# Patient Record
Sex: MALE | Race: WHITE | NOT HISPANIC OR LATINO
[De-identification: names, ages, dates, MRNs, and addresses within clinical notes are randomized per-mention and may not be internally consistent; named-entity substitution may affect disease eponyms.]

---

## 2022-03-02 PROBLEM — Z00.00 ENCOUNTER FOR PREVENTIVE HEALTH EXAMINATION: Status: ACTIVE | Noted: 2022-03-02

## 2023-04-20 ENCOUNTER — APPOINTMENT (OUTPATIENT)
Dept: ORTHOPEDIC SURGERY | Facility: CLINIC | Age: 68
End: 2023-04-20
Payer: MEDICARE

## 2023-04-20 VITALS — BODY MASS INDEX: 25.01 KG/M2 | WEIGHT: 165 LBS | HEIGHT: 68 IN

## 2023-04-20 PROCEDURE — 73030 X-RAY EXAM OF SHOULDER: CPT | Mod: 50

## 2023-04-20 PROCEDURE — J3490M: CUSTOM | Mod: NC

## 2023-04-20 PROCEDURE — 20611 DRAIN/INJ JOINT/BURSA W/US: CPT | Mod: 50

## 2023-04-20 PROCEDURE — 99213 OFFICE O/P EST LOW 20 MIN: CPT | Mod: 25

## 2023-04-20 NOTE — PROCEDURE
[FreeTextEntry3] : Large Joint Injection / Aspiration: Celestone, Lidocaine, Marcaine and Guidance Ultrasound\par Large Joint Injection was performed because of pain and inflammation. Anesthesia: ethyl chloride sprayed topically.. \par Celestone: An injection of Celestone 12 mg , 2 cc. Needle size: 22 gauge , 1.5 inch. \par Lidocaine: 3 cc. \par Marcaine: 3 cc. \par \par Medication was injected in the bilateral shoulders. Patient has tried OTC's including aspirin, Ibuprofen, Aleve etc or prescription NSAIDS, anr exercises at home and/ or physical therapy without satisfactory response. After verbal consent using sterile preparation and technique. The risks, benefits, and alternatives to cortisone injection were explained in full to the patient. Risks outlined include but are not limited to infection, sepsis, bleeding, scarring, skin discoloration, temporary increase in pain, syncopal episode, failure to resolve symptoms, allergic reaction, symptom recurrence, and elevation of blood sugar in diabetics. Patient understood the risks. All questions were answered. After discussion of options, patient requested an injection. Oral informed consent was obtained and sterile prep was done of the injection site. Sterile technique was utilized for the procedure including the preparation of the solutions used for the injection. Patient tolerated the procedure well. Advised to ice the injection site this evening. Prep with betadine locally to site. Sterile technique used. Patient tolerated procedure well. Post Procedure Instructions: Patient was advised to call if redness, pain, or fever occur and apply ice for 15 min. out of every hour for the next 12-24 hours as tolerated. patient was advised to rest the joint(s) for 1 days. \par \par Ultrasound Guidance was used for the following reasons: for Glenohumeral injection. \par \par Ultrasound guided injection was performed of the shoulders, visualization of the needle and placement of injection was performed without complication.\par

## 2023-04-20 NOTE — PHYSICAL EXAM
[Sitting] : sitting [Moderate] : moderate [5___] : internal rotation 5[unfilled]/5 [Bilateral] : shoulder bilaterally [Degenerative change] : Degenerative change [Glenohumeral arthritis] : Glenohumeral arthritis [] : no scapular winging [FreeTextEntry1] : adv GH [TWNoteComboBox7] : active forward flexion 140 degrees [TWNoteComboBox6] : internal rotation 0 degrees [de-identified] : external rotation 20 degrees

## 2023-04-20 NOTE — HISTORY OF PRESENT ILLNESS
[Sudden] : sudden [7] : 7 [0] : 0 [Dull/Aching] : dull/aching [Localized] : localized [Intermittent] : intermittent [Leisure] : leisure [Sleep] : sleep [Rest] : rest [de-identified] : 68 year old male with increasing symptoms in both shoulders, right greater than left, history of OA in both shoulders, and pain has been worsening recently. He has pain with reaching over head, behind back and sleeping. No radicular complaints. ROM has been more limited. He has had CSI in the past with some help.  [] : Post Surgical Visit: no [FreeTextEntry1] : bilateral shoulders [FreeTextEntry5] : Wear and tear over time [de-identified] : activity/sleep [de-identified] :

## 2023-04-20 NOTE — DISCUSSION/SUMMARY
[de-identified] : Patient allowed to gently start resuming activities. \par Discussed change to medication prescription and usage. \par Bracing options discussed with patient. \par Activity modification as needed\par Discussed poss future surgery, pt deciding\par needs TSAs\par will try csi

## 2023-04-27 ENCOUNTER — APPOINTMENT (OUTPATIENT)
Dept: ORTHOPEDIC SURGERY | Facility: CLINIC | Age: 68
End: 2023-04-27
Payer: MEDICARE

## 2023-04-27 VITALS — BODY MASS INDEX: 25.01 KG/M2 | HEIGHT: 68 IN | WEIGHT: 165 LBS

## 2023-04-27 PROCEDURE — 20611 DRAIN/INJ JOINT/BURSA W/US: CPT | Mod: RT

## 2023-04-27 PROCEDURE — 99213 OFFICE O/P EST LOW 20 MIN: CPT | Mod: 25

## 2023-04-27 NOTE — PHYSICAL EXAM
[Right] : right knee [5___] : quadriceps 5[unfilled]/5 [] : no ecchymosis [TWNoteComboBox7] : flexion 125 degrees

## 2023-04-27 NOTE — ASSESSMENT
[FreeTextEntry1] : Patient allowed to gently start resuming activities.\par Discussed change to medication prescription and usage. \par Offered cortisone steroid injection. \par Bracing options discussed with patient. \par Hyaluronic Acid inj pamphlet given to pt.\par 04/27/2023 \par RE:  ARIEL HU \par \par Acct #- [id number] \par Attention:  Nurse Reviewer /Medical Director\par \par I am writing this letter as a medical necessity for HA orthovisc R knee\par Patient has tried analgesics, non-steroid anti-inflammatory agents, \par physical therapy, hot or cold compresses,injections of corticosteroids, etc)  which in combination or by themselves has not worked.\par Based on my patient's condition, I strongly believe that the Hyaluronic aid injections is medically needed.\par  \par Thank you for your time and consideration.   \par \par

## 2023-04-28 ENCOUNTER — APPOINTMENT (OUTPATIENT)
Dept: ORTHOPEDIC SURGERY | Facility: CLINIC | Age: 68
End: 2023-04-28
Payer: MEDICARE

## 2023-04-28 VITALS — HEIGHT: 68 IN | WEIGHT: 165 LBS | BODY MASS INDEX: 25.01 KG/M2

## 2023-04-28 DIAGNOSIS — Z78.9 OTHER SPECIFIED HEALTH STATUS: ICD-10-CM

## 2023-04-28 DIAGNOSIS — E78.00 PURE HYPERCHOLESTEROLEMIA, UNSPECIFIED: ICD-10-CM

## 2023-04-28 DIAGNOSIS — I10 ESSENTIAL (PRIMARY) HYPERTENSION: ICD-10-CM

## 2023-04-28 PROCEDURE — 99214 OFFICE O/P EST MOD 30 MIN: CPT

## 2023-04-28 NOTE — ASSESSMENT
[FreeTextEntry1] : Bilateral GH DJD.\par Xrays and advanced imaging reviewed.\par Discussed op versus non op tx, including the r/b/a/c of both.\par Discussed rehab and recovery after TSA/RSA.\par Discussed timing, frequency and efficacy of cortisone injections.\par Discussed risks of repeated cortisone injections. \par B/L GH injections by Dr. Chase 4/20/23.\par Discussed trial of visco supplementation.\par Aleve prn.\par RTO prn.\par \par

## 2023-04-28 NOTE — PHYSICAL EXAM
[Bilateral] : shoulder bilaterally [5 ___] : forward flexion 5[unfilled]/5 [5___] : external rotation 5[unfilled]/5 [] : discomfort with strength testing [FreeTextEntry9] : FE R 130  L 140\par ER 40 b/l

## 2023-04-28 NOTE — HISTORY OF PRESENT ILLNESS
[de-identified] : 4/28/23: 67 yo RHD male with bilateral shoulder pain since about 2021. Right is worse than left. There is pain with reaching. He has pain at night and is unable to sleep. He saw Dr. Chase and had CSI 4/20/23 with some relief. He sees pain mgt and used to take Naproxen and now Aleve.

## 2023-05-04 ENCOUNTER — APPOINTMENT (OUTPATIENT)
Dept: ORTHOPEDIC SURGERY | Facility: CLINIC | Age: 68
End: 2023-05-04
Payer: MEDICARE

## 2023-05-04 PROCEDURE — 20611 DRAIN/INJ JOINT/BURSA W/US: CPT | Mod: RT

## 2023-05-04 NOTE — HISTORY OF PRESENT ILLNESS
[2] : 2 [Orthovisc] : Orthovisc [de-identified] : PAtient returns for Orthovisc #2 right knee.  [] : no [de-identified] : 04/27/2023 [de-identified] : right knee

## 2023-05-11 ENCOUNTER — APPOINTMENT (OUTPATIENT)
Dept: ORTHOPEDIC SURGERY | Facility: CLINIC | Age: 68
End: 2023-05-11
Payer: MEDICARE

## 2023-05-11 VITALS — BODY MASS INDEX: 25.01 KG/M2 | HEIGHT: 68 IN | WEIGHT: 165 LBS

## 2023-05-11 DIAGNOSIS — M17.11 UNILATERAL PRIMARY OSTEOARTHRITIS, RIGHT KNEE: ICD-10-CM

## 2023-05-11 PROCEDURE — 20611 DRAIN/INJ JOINT/BURSA W/US: CPT | Mod: RT

## 2023-05-11 RX ORDER — MELOXICAM 15 MG/1
15 TABLET ORAL
Qty: 30 | Refills: 1 | Status: ACTIVE | COMMUNITY
Start: 2023-05-11 | End: 1900-01-01

## 2023-05-18 ENCOUNTER — APPOINTMENT (OUTPATIENT)
Dept: ORTHOPEDIC SURGERY | Facility: CLINIC | Age: 68
End: 2023-05-18
Payer: MEDICARE

## 2023-05-18 PROCEDURE — 20611 DRAIN/INJ JOINT/BURSA W/US: CPT | Mod: RT

## 2023-05-18 NOTE — DISCUSSION/SUMMARY
[de-identified] : rest, ice, activity modification. \par \par frequency of visco reviewed and csi\par returns in 6 weeks as needed.

## 2023-05-18 NOTE — HISTORY OF PRESENT ILLNESS
[4] : 4 [Orthovisc] : Orthovisc [] : no [de-identified] : 05/11/2023 [de-identified] : right knee  [de-identified] : Patient returns for Orthovisc #4 right knee

## 2023-05-18 NOTE — HISTORY OF PRESENT ILLNESS
[4] : 4 [Orthovisc] : Orthovisc [] : no [de-identified] : 05/11/2023 [de-identified] : right knee  [de-identified] : Patient returns for Orthovisc #4 right knee

## 2023-05-18 NOTE — DISCUSSION/SUMMARY
[de-identified] : rest, ice, activity modification. \par \par frequency of visco reviewed and csi\par returns in 6 weeks as needed.

## 2023-05-24 ENCOUNTER — APPOINTMENT (OUTPATIENT)
Dept: ORTHOPEDIC SURGERY | Facility: CLINIC | Age: 68
End: 2023-05-24
Payer: MEDICARE

## 2023-05-24 VITALS — WEIGHT: 165 LBS | BODY MASS INDEX: 25.01 KG/M2 | HEIGHT: 68 IN

## 2023-05-24 PROCEDURE — 20611 DRAIN/INJ JOINT/BURSA W/US: CPT | Mod: 50

## 2023-05-24 PROCEDURE — 99213 OFFICE O/P EST LOW 20 MIN: CPT | Mod: 25

## 2023-05-24 NOTE — HISTORY OF PRESENT ILLNESS
[de-identified] : 5/24/23: Here for follow up.  He wants to try orthovisc injections.\par \par 4/28/23: 69 yo RHD male with bilateral shoulder pain since about 2021. Right is worse than left. There is pain with reaching. He has pain at night and is unable to sleep. He saw Dr. Chase and had CSI 4/20/23 with some relief. He sees pain mgt and used to take Naproxen and now Aleve.

## 2023-05-24 NOTE — ASSESSMENT
[FreeTextEntry1] : Bilateral GH DJD.\par Xrays and advanced imaging reviewed.\par Discussed op versus non op tx, including the r/b/a/c of both.\par Discussed rehab and recovery after TSA/RSA.\par Discussed timing, frequency and efficacy of cortisone injections.\par Discussed risks of repeated cortisone injections. \par B/L GH injections by Dr. Chase 4/20/23.\par B/l shoulder orthovisc #1. today.\par RTO to continue. \par \par Procedure Note:\par Viscosupplementation Injection: X-ray evidence of Osteoarthritis on this or prior visit and Patient has tried OTC's including aspirin, Ibuprofen, Aleve etc or prescription NSAIDs, and/or exercises at home and/ or physical therapy without satisfactory response. \par \par An injection of Orthovisc 2ml was injected into the right and left shoulders. The risks, benefits, and alternatives to Viscosupplementation injection were explained in full to the patient. Risks outlined include but are not limited to infection, sepsis, bleeding, scarring, skin discoloration, temporary increase in pain, syncopal episode, failure to resolve symptoms, allergic reaction, and symptom recurrence. Signs and symptoms of infection reviewed and patient advised to call immediately for redness, fevers, and/or chills. Patient understood the risks. All questions were answered. After discussion of options, patient requested Viscosupplementation. Oral informed consent was obtained and sterile prep of the injection site was performed using alcohol. Sterile technique was utilized for the procedure including the preparation of the solutions used for the injection. Ethyl chloride spray was used topically.  Sterile technique used. Patient tolerated procedure well. Post Procedure Instructions: Patient was advised to call if redness, pain, or fever occur and apply ice for 15 min. out of every hour for the next 12-24 hours as tolerated. patient was advised to rest the joint(s) for 2 days.\par \par Ultrasound Guidance was used for the following reasons: for Glenohumeral injection. \par Ultrasound guided injection was performed of the shoulder, visualization of the needle and placement of injection was performed without complication.\par \par

## 2023-05-31 ENCOUNTER — APPOINTMENT (OUTPATIENT)
Dept: ORTHOPEDIC SURGERY | Facility: CLINIC | Age: 68
End: 2023-05-31
Payer: MEDICARE

## 2023-05-31 VITALS — WEIGHT: 165 LBS | HEIGHT: 68 IN | BODY MASS INDEX: 25.01 KG/M2

## 2023-05-31 PROCEDURE — 20611 DRAIN/INJ JOINT/BURSA W/US: CPT | Mod: 50

## 2023-05-31 NOTE — ASSESSMENT
[FreeTextEntry1] : Bilateral GH DJD.\par Xrays and advanced imaging reviewed.\par Discussed op versus non op tx, including the r/b/a/c of both.\par Discussed rehab and recovery after TSA/RSA.\par Discussed timing, frequency and efficacy of cortisone injections.\par Discussed risks of repeated cortisone injections. \par B/L GH injections by Dr. Chase 4/20/23.\par B/l shoulder orthovisc #2. today.\par RTO 1 week to continue. \par \par Procedure Note:\par Viscosupplementation Injection: X-ray evidence of Osteoarthritis on this or prior visit and Patient has tried OTC's including aspirin, Ibuprofen, Aleve etc or prescription NSAIDs, and/or exercises at home and/ or physical therapy without satisfactory response. \par \par An injection of Orthovisc 2ml was injected into the right and left shoulders. The risks, benefits, and alternatives to Viscosupplementation injection were explained in full to the patient. Risks outlined include but are not limited to infection, sepsis, bleeding, scarring, skin discoloration, temporary increase in pain, syncopal episode, failure to resolve symptoms, allergic reaction, and symptom recurrence. Signs and symptoms of infection reviewed and patient advised to call immediately for redness, fevers, and/or chills. Patient understood the risks. All questions were answered. After discussion of options, patient requested Viscosupplementation. Oral informed consent was obtained and sterile prep of the injection site was performed using alcohol. Sterile technique was utilized for the procedure including the preparation of the solutions used for the injection. Ethyl chloride spray was used topically.  Sterile technique used. Patient tolerated procedure well. Post Procedure Instructions: Patient was advised to call if redness, pain, or fever occur and apply ice for 15 min. out of every hour for the next 12-24 hours as tolerated. patient was advised to rest the joint(s) for 2 days.\par \par Ultrasound Guidance was used for the following reasons: for Glenohumeral injection. \par Ultrasound guided injection was performed of the shoulder, visualization of the needle and placement of injection was performed without complication.\par \par

## 2023-05-31 NOTE — HISTORY OF PRESENT ILLNESS
[2] : 2 [Orthovisc] : Orthovisc [de-identified] : 5/31/23: Here for Orthovisc #2 bilateral shoulders\par \par 5/24/23: Here for follow up.  He wants to try orthovisc injections.\par \par 4/28/23: 67 yo RHD male with bilateral shoulder pain since about 2021. Right is worse than left. There is pain with reaching. He has pain at night and is unable to sleep. He saw Dr. Chase and had CSI 4/20/23 with some relief. He sees pain mgt and used to take Naproxen and now Aleve. [] : no [de-identified] : 5/24/23 [de-identified] : bilateral shoulders [TWNoteComboBox1] : 0%

## 2023-06-07 ENCOUNTER — APPOINTMENT (OUTPATIENT)
Dept: ORTHOPEDIC SURGERY | Facility: CLINIC | Age: 68
End: 2023-06-07
Payer: MEDICARE

## 2023-06-07 VITALS — HEIGHT: 68 IN | BODY MASS INDEX: 25.01 KG/M2 | WEIGHT: 165 LBS

## 2023-06-07 PROCEDURE — 20611 DRAIN/INJ JOINT/BURSA W/US: CPT | Mod: 50

## 2023-06-07 NOTE — HISTORY OF PRESENT ILLNESS
[3] : 3 [Orthovisc] : Orthovisc [de-identified] : 6/7/23: Here for Orthovisc #3 bilateral shoulders\par \par 5/31/23: Here for Orthovisc #2 bilateral shoulders\par \par 5/24/23: Here for follow up.  He wants to try orthovisc injections.\par \par 4/28/23: 69 yo RHD male with bilateral shoulder pain since about 2021. Right is worse than left. There is pain with reaching. He has pain at night and is unable to sleep. He saw Dr. Chase and had CSI 4/20/23 with some relief. He sees pain mgt and used to take Naproxen and now Aleve. [] : no [FreeTextEntry1] : brendan shoulders [de-identified] : 5/31/23 [de-identified] : bilateral shoulders [TWNoteComboBox1] : 0%

## 2023-06-07 NOTE — ASSESSMENT
[FreeTextEntry1] : Bilateral GH DJD.\par Xrays and advanced imaging reviewed.\par Discussed op versus non op tx, including the r/b/a/c of both.\par Discussed rehab and recovery after TSA/RSA.\par Discussed timing, frequency and efficacy of cortisone injections.\par Discussed risks of repeated cortisone injections. \par B/L GH injections by Dr. Chase 4/20/23.\par B/l shoulder orthovisc #3 today.\par RTO to continue. \par \par Procedure Note:\par Viscosupplementation Injection: X-ray evidence of Osteoarthritis on this or prior visit and Patient has tried OTC's including aspirin, Ibuprofen, Aleve etc or prescription NSAIDs, and/or exercises at home and/ or physical therapy without satisfactory response. \par \par An injection of Orthovisc 2ml was injected into the right and left shoulders. The risks, benefits, and alternatives to Viscosupplementation injection were explained in full to the patient. Risks outlined include but are not limited to infection, sepsis, bleeding, scarring, skin discoloration, temporary increase in pain, syncopal episode, failure to resolve symptoms, allergic reaction, and symptom recurrence. Signs and symptoms of infection reviewed and patient advised to call immediately for redness, fevers, and/or chills. Patient understood the risks. All questions were answered. After discussion of options, patient requested Viscosupplementation. Oral informed consent was obtained and sterile prep of the injection site was performed using alcohol. Sterile technique was utilized for the procedure including the preparation of the solutions used for the injection. Ethyl chloride spray was used topically.  Sterile technique used. Patient tolerated procedure well. Post Procedure Instructions: Patient was advised to call if redness, pain, or fever occur and apply ice for 15 min. out of every hour for the next 12-24 hours as tolerated. patient was advised to rest the joint(s) for 2 days.\par \par Ultrasound Guidance was used for the following reasons: for Glenohumeral injection. \par Ultrasound guided injection was performed of the shoulder, visualization of the needle and placement of injection was performed without complication.\par \par

## 2023-06-28 ENCOUNTER — APPOINTMENT (OUTPATIENT)
Dept: ORTHOPEDIC SURGERY | Facility: CLINIC | Age: 68
End: 2023-06-28
Payer: MEDICARE

## 2023-06-28 VITALS — BODY MASS INDEX: 25.01 KG/M2 | HEIGHT: 68 IN | WEIGHT: 165 LBS

## 2023-06-28 PROCEDURE — 20611 DRAIN/INJ JOINT/BURSA W/US: CPT | Mod: 50

## 2023-06-28 NOTE — HISTORY OF PRESENT ILLNESS
[3] : 3 [Orthovisc] : Orthovisc [de-identified] : 6/28/23: Here for Orthovisc #4 bilateral shoulders\par \par 6/7/23: Here for Orthovisc #3 bilateral shoulders\par \par 5/31/23: Here for Orthovisc #2 bilateral shoulders\par \par 5/24/23: Here for follow up.  He wants to try orthovisc injections.\par \par 4/28/23: 67 yo RHD male with bilateral shoulder pain since about 2021. Right is worse than left. There is pain with reaching. He has pain at night and is unable to sleep. He saw Dr. Chase and had CSI 4/20/23 with some relief. He sees pain mgt and used to take Naproxen and now Aleve. [] : no [FreeTextEntry1] : brendan shoulders [de-identified] : 5/31/23 [de-identified] : bilateral shoulders [TWNoteComboBox1] : 0%

## 2023-06-28 NOTE — ASSESSMENT
[FreeTextEntry1] : Bilateral GH DJD.\par Xrays and advanced imaging reviewed.\par Discussed op versus non op tx, including the r/b/a/c of both.\par Discussed rehab and recovery after TSA/RSA.\par Discussed timing, frequency and efficacy of cortisone injections.\par Discussed risks of repeated cortisone injections. \par B/L GH injections by Dr. Chase 4/20/23.\par B/l shoulder orthovisc #4 today.\par RTO 6 weeks.\par \par Procedure Note:\par Viscosupplementation Injection: X-ray evidence of Osteoarthritis on this or prior visit and Patient has tried OTC's including aspirin, Ibuprofen, Aleve etc or prescription NSAIDs, and/or exercises at home and/ or physical therapy without satisfactory response. \par \par An injection of Orthovisc 2ml was injected into the right and left shoulders. The risks, benefits, and alternatives to Viscosupplementation injection were explained in full to the patient. Risks outlined include but are not limited to infection, sepsis, bleeding, scarring, skin discoloration, temporary increase in pain, syncopal episode, failure to resolve symptoms, allergic reaction, and symptom recurrence. Signs and symptoms of infection reviewed and patient advised to call immediately for redness, fevers, and/or chills. Patient understood the risks. All questions were answered. After discussion of options, patient requested Viscosupplementation. Oral informed consent was obtained and sterile prep of the injection site was performed using alcohol. Sterile technique was utilized for the procedure including the preparation of the solutions used for the injection. Ethyl chloride spray was used topically.  Sterile technique used. Patient tolerated procedure well. Post Procedure Instructions: Patient was advised to call if redness, pain, or fever occur and apply ice for 15 min. out of every hour for the next 12-24 hours as tolerated. patient was advised to rest the joint(s) for 2 days.\par \par Ultrasound Guidance was used for the following reasons: for Glenohumeral injection. \par Ultrasound guided injection was performed of the shoulder, visualization of the needle and placement of injection was performed without complication.\par \par

## 2023-08-09 ENCOUNTER — APPOINTMENT (OUTPATIENT)
Dept: ORTHOPEDIC SURGERY | Facility: CLINIC | Age: 68
End: 2023-08-09
Payer: MEDICARE

## 2023-08-09 VITALS — WEIGHT: 165 LBS | HEIGHT: 68 IN | BODY MASS INDEX: 25.01 KG/M2

## 2023-08-09 PROCEDURE — 99213 OFFICE O/P EST LOW 20 MIN: CPT

## 2023-08-09 RX ORDER — IBUPROFEN 800 MG/1
800 TABLET, FILM COATED ORAL TWICE DAILY
Qty: 60 | Refills: 2 | Status: COMPLETED | COMMUNITY
Start: 2023-08-09 | End: 2023-11-07

## 2023-08-09 NOTE — ASSESSMENT
[FreeTextEntry1] : Bilateral GH DJD. Xrays and advanced imaging reviewed. Discussed op versus non op tx, including the r/b/a/c of both. Discussed rehab and recovery after TSA/RSA. Discussed timing, frequency and efficacy of cortisone injections. Discussed risks of repeated cortisone injections.  B/L GH injections by Dr. Chase 4/20/23. B/l shoulder orthovisc completed 6/28/23, mild relief. Discussed timing of injections. He does not want to repeat CSI. Ibupropfen 800mg prn. RTO prn after 12/29/23.

## 2023-08-09 NOTE — HISTORY OF PRESENT ILLNESS
[7] : 7 [3] : 3 [Dull/Aching] : dull/aching [Sharp] : sharp [Stabbing] : stabbing [de-identified] : 8/9/23: Here for follow up. He states orthovisc helped about 20%. He states sleeping is a little better.  6/28/23: Here for Orthovisc #4 bilateral shoulders  6/7/23: Here for Orthovisc #3 bilateral shoulders  5/31/23: Here for Orthovisc #2 bilateral shoulders  5/24/23: Here for follow up.  He wants to try orthovisc injections.  4/28/23: 67 yo RHD male with bilateral shoulder pain since about 2021. Right is worse than left. There is pain with reaching. He has pain at night and is unable to sleep. He saw Dr. Chase and had CSI 4/20/23 with some relief. He sees pain mgt and used to take Naproxen and now Aleve. [] : Post Surgical Visit: no [FreeTextEntry1] : bilateral shoulders

## 2023-12-06 ENCOUNTER — APPOINTMENT (OUTPATIENT)
Dept: ORTHOPEDIC SURGERY | Facility: CLINIC | Age: 68
End: 2023-12-06
Payer: MEDICARE

## 2023-12-06 VITALS — WEIGHT: 165 LBS | BODY MASS INDEX: 25.01 KG/M2 | HEIGHT: 68 IN

## 2023-12-06 PROCEDURE — 99213 OFFICE O/P EST LOW 20 MIN: CPT

## 2024-01-03 ENCOUNTER — APPOINTMENT (OUTPATIENT)
Dept: ORTHOPEDIC SURGERY | Facility: CLINIC | Age: 69
End: 2024-01-03
Payer: MEDICARE

## 2024-01-03 VITALS — HEIGHT: 68 IN | BODY MASS INDEX: 25.01 KG/M2 | WEIGHT: 165 LBS

## 2024-01-03 PROCEDURE — 99213 OFFICE O/P EST LOW 20 MIN: CPT | Mod: 25

## 2024-01-03 PROCEDURE — 20611 DRAIN/INJ JOINT/BURSA W/US: CPT | Mod: 50

## 2024-01-03 NOTE — ASSESSMENT
[FreeTextEntry1] : Bilateral GH DJD. Xrays and advanced imaging reviewed. Previously discussed TSA, RSA.  B/L GH injections by Dr. Chase 4/20/23. B/l shoulder orthovisc completed 6/28/23, mild relief. Discussed timing of injections. He does not want to repeat CSI. Ibupropfen 800mg prn. Bilateral shoulder orthovisc #1 given. RTO 1 week.  Procedure Note: Viscosupplementation Injection: X-ray evidence of Osteoarthritis on this or prior visit and Patient has tried OTC's including aspirin, Ibuprofen, Aleve etc or prescription NSAIDs, and/or exercises at home and/ or physical therapy without satisfactory response.   An injection of Orthovisc 2ml was injected into the left and right shoulder. The risks, benefits, and alternatives to Viscosupplementation injection were explained in full to the patient. Risks outlined include but are not limited to infection, sepsis, bleeding, scarring, skin discoloration, temporary increase in pain, syncopal episode, failure to resolve symptoms, allergic reaction, and symptom recurrence. Signs and symptoms of infection reviewed and patient advised to call immediately for redness, fevers, and/or chills. Patient understood the risks. All questions were answered. After discussion of options, patient requested Viscosupplementation. Oral informed consent was obtained and sterile prep of the injection site was performed using alcohol. Sterile technique was utilized for the procedure including the preparation of the solutions used for the injection. Ethyl chloride spray was used topically.  Sterile technique used. Patient tolerated procedure well. Post Procedure Instructions: Patient was advised to call if redness, pain, or fever occur and apply ice for 15 min. out of every hour for the next 12-24 hours as tolerated. patient was advised to rest the joint(s) for 2 days.  Ultrasound Guidance was used for the following reasons: for Glenohumeral injection.  Ultrasound guided injection was performed of the shoulder, visualization of the needle and placement of injection was performed without complication.

## 2024-01-03 NOTE — HISTORY OF PRESENT ILLNESS
[7] : 7 [3] : 3 [Dull/Aching] : dull/aching [de-identified] : 1/3/24: Here for follow up. He reports continued pain. He would like to start visco series.  12/6/23: Here for follow up. He states right is worse than left. There is pain at night and with lifting.  8/9/23: Here for follow up. He states orthovisc helped about 20%. He states sleeping is a little better.  6/28/23: Here for Orthovisc #4 bilateral shoulders  6/7/23: Here for Orthovisc #3 bilateral shoulders  5/31/23: Here for Orthovisc #2 bilateral shoulders  5/24/23: Here for follow up.  He wants to try orthovisc injections.  4/28/23: 67 yo RHD male with bilateral shoulder pain since about 2021. Right is worse than left. There is pain with reaching. He has pain at night and is unable to sleep. He saw Dr. Chase and had CSI 4/20/23 with some relief. He sees pain mgt and used to take Naproxen and now Aleve. [FreeTextEntry1] : shoulders

## 2024-01-10 ENCOUNTER — APPOINTMENT (OUTPATIENT)
Dept: ORTHOPEDIC SURGERY | Facility: CLINIC | Age: 69
End: 2024-01-10
Payer: MEDICARE

## 2024-01-10 VITALS — HEIGHT: 68 IN | WEIGHT: 165 LBS | BODY MASS INDEX: 25.01 KG/M2

## 2024-01-10 PROCEDURE — 20611 DRAIN/INJ JOINT/BURSA W/US: CPT | Mod: 50

## 2024-01-10 NOTE — ASSESSMENT
[FreeTextEntry1] : Bilateral GH DJD. Xrays and advanced imaging reviewed. Previously discussed TSA, RSA.  B/L GH injections by Dr. Chase 4/20/23. B/l shoulder orthovisc completed 6/28/23, mild relief. Discussed timing of injections. He does not want to repeat CSI. Ibupropfen 800mg prn. Bilateral shoulder orthovisc #2 given. RTO 1 week.  Procedure Note: Viscosupplementation Injection: X-ray evidence of Osteoarthritis on this or prior visit and Patient has tried OTC's including aspirin, Ibuprofen, Aleve etc or prescription NSAIDs, and/or exercises at home and/ or physical therapy without satisfactory response.   An injection of Orthovisc 2ml was injected into the left and right shoulder. The risks, benefits, and alternatives to Viscosupplementation injection were explained in full to the patient. Risks outlined include but are not limited to infection, sepsis, bleeding, scarring, skin discoloration, temporary increase in pain, syncopal episode, failure to resolve symptoms, allergic reaction, and symptom recurrence. Signs and symptoms of infection reviewed and patient advised to call immediately for redness, fevers, and/or chills. Patient understood the risks. All questions were answered. After discussion of options, patient requested Viscosupplementation. Oral informed consent was obtained and sterile prep of the injection site was performed using alcohol. Sterile technique was utilized for the procedure including the preparation of the solutions used for the injection. Ethyl chloride spray was used topically.  Sterile technique used. Patient tolerated procedure well. Post Procedure Instructions: Patient was advised to call if redness, pain, or fever occur and apply ice for 15 min. out of every hour for the next 12-24 hours as tolerated. patient was advised to rest the joint(s) for 2 days.  Ultrasound Guidance was used for the following reasons: for Glenohumeral injection.  Ultrasound guided injection was performed of the shoulder, visualization of the needle and placement of injection was performed without complication.

## 2024-01-10 NOTE — HISTORY OF PRESENT ILLNESS
[7] : 7 [3] : 3 [Dull/Aching] : dull/aching [] : This patient has had an injection before: yes [Orthovisc] : Orthovisc [de-identified] : 1/10/24: Here for bilateral shoulder orthovisc #2.  1/3/24: Here for follow up. He reports continued pain. He would like to start visco series.  12/6/23: Here for follow up. He states right is worse than left. There is pain at night and with lifting.  8/9/23: Here for follow up. He states orthovisc helped about 20%. He states sleeping is a little better.  6/28/23: Here for Orthovisc #4 bilateral shoulders  6/7/23: Here for Orthovisc #3 bilateral shoulders  5/31/23: Here for Orthovisc #2 bilateral shoulders  5/24/23: Here for follow up.  He wants to try orthovisc injections.  4/28/23: 69 yo RHD male with bilateral shoulder pain since about 2021. Right is worse than left. There is pain with reaching. He has pain at night and is unable to sleep. He saw Dr. Chase and had CSI 4/20/23 with some relief. He sees pain mgt and used to take Naproxen and now Aleve. [FreeTextEntry1] : shoulders

## 2024-01-17 ENCOUNTER — APPOINTMENT (OUTPATIENT)
Dept: ORTHOPEDIC SURGERY | Facility: CLINIC | Age: 69
End: 2024-01-17
Payer: MEDICARE

## 2024-01-17 VITALS — BODY MASS INDEX: 25.01 KG/M2 | WEIGHT: 165 LBS | HEIGHT: 68 IN

## 2024-01-17 PROCEDURE — 20611 DRAIN/INJ JOINT/BURSA W/US: CPT | Mod: 50

## 2024-01-17 NOTE — HISTORY OF PRESENT ILLNESS
[7] : 7 [3] : 3 [Dull/Aching] : dull/aching [] : This patient has had an injection before: yes [Orthovisc] : Orthovisc [de-identified] : 1/17/24: Here for bilateral shoulder orthovisc #3.  1/10/24: Here for bilateral shoulder orthovisc #2.  1/3/24: Here for follow up. He reports continued pain. He would like to start visco series.  12/6/23: Here for follow up. He states right is worse than left. There is pain at night and with lifting.  8/9/23: Here for follow up. He states orthovisc helped about 20%. He states sleeping is a little better.  6/28/23: Here for Orthovisc #4 bilateral shoulders  6/7/23: Here for Orthovisc #3 bilateral shoulders  5/31/23: Here for Orthovisc #2 bilateral shoulders  5/24/23: Here for follow up.  He wants to try orthovisc injections.  4/28/23: 67 yo RHD male with bilateral shoulder pain since about 2021. Right is worse than left. There is pain with reaching. He has pain at night and is unable to sleep. He saw Dr. Chase and had CSI 4/20/23 with some relief. He sees pain mgt and used to take Naproxen and now Aleve. [FreeTextEntry1] : shoulders

## 2024-01-17 NOTE — ASSESSMENT
[FreeTextEntry1] : Bilateral GH DJD. Xrays and advanced imaging reviewed. Previously discussed TSA, RSA.  B/L GH injections by Dr. Chase 4/20/23. B/l shoulder orthovisc completed 6/28/23, mild relief. Discussed timing of injections. He does not want to repeat CSI. Ibupropfen 800mg prn. Bilateral shoulder orthovisc #3 given. RTO 1 week.  Procedure Note: Viscosupplementation Injection: X-ray evidence of Osteoarthritis on this or prior visit and Patient has tried OTC's including aspirin, Ibuprofen, Aleve etc or prescription NSAIDs, and/or exercises at home and/ or physical therapy without satisfactory response.   An injection of Orthovisc 2ml was injected into the left and right shoulder. The risks, benefits, and alternatives to Viscosupplementation injection were explained in full to the patient. Risks outlined include but are not limited to infection, sepsis, bleeding, scarring, skin discoloration, temporary increase in pain, syncopal episode, failure to resolve symptoms, allergic reaction, and symptom recurrence. Signs and symptoms of infection reviewed and patient advised to call immediately for redness, fevers, and/or chills. Patient understood the risks. All questions were answered. After discussion of options, patient requested Viscosupplementation. Oral informed consent was obtained and sterile prep of the injection site was performed using alcohol. Sterile technique was utilized for the procedure including the preparation of the solutions used for the injection. Ethyl chloride spray was used topically.  Sterile technique used. Patient tolerated procedure well. Post Procedure Instructions: Patient was advised to call if redness, pain, or fever occur and apply ice for 15 min. out of every hour for the next 12-24 hours as tolerated. patient was advised to rest the joint(s) for 2 days.  Ultrasound Guidance was used for the following reasons: for Glenohumeral injection.  Ultrasound guided injection was performed of the shoulder, visualization of the needle and placement of injection was performed without complication.

## 2024-01-24 ENCOUNTER — APPOINTMENT (OUTPATIENT)
Dept: ORTHOPEDIC SURGERY | Facility: CLINIC | Age: 69
End: 2024-01-24
Payer: MEDICARE

## 2024-01-24 DIAGNOSIS — M19.012 PRIMARY OSTEOARTHRITIS, LEFT SHOULDER: ICD-10-CM

## 2024-01-24 DIAGNOSIS — M19.011 PRIMARY OSTEOARTHRITIS, RIGHT SHOULDER: ICD-10-CM

## 2024-01-24 PROCEDURE — 20611 DRAIN/INJ JOINT/BURSA W/US: CPT | Mod: 50

## 2024-01-24 RX ORDER — IBUPROFEN 600 MG/1
600 TABLET, FILM COATED ORAL 3 TIMES DAILY
Qty: 90 | Refills: 2 | Status: ACTIVE | COMMUNITY
Start: 2024-01-24 | End: 1900-01-01

## 2024-01-24 NOTE — ASSESSMENT
[FreeTextEntry1] : Bilateral GH DJD. Xrays and advanced imaging reviewed. Previously discussed TSA, RSA.  B/L GH injections by Dr. Chase 4/20/23. B/l shoulder orthovisc completed 6/28/23, mild relief. Discussed timing of injections. He does not want to repeat CSI. Ibupropfen 600mg prn. Bilateral shoulder orthovisc #4 given. RTO  6 months prn.  Procedure Note: Viscosupplementation Injection: X-ray evidence of Osteoarthritis on this or prior visit and Patient has tried OTC's including aspirin, Ibuprofen, Aleve etc or prescription NSAIDs, and/or exercises at home and/ or physical therapy without satisfactory response.   An injection of Orthovisc 2ml was injected into the left and right shoulder. The risks, benefits, and alternatives to Viscosupplementation injection were explained in full to the patient. Risks outlined include but are not limited to infection, sepsis, bleeding, scarring, skin discoloration, temporary increase in pain, syncopal episode, failure to resolve symptoms, allergic reaction, and symptom recurrence. Signs and symptoms of infection reviewed and patient advised to call immediately for redness, fevers, and/or chills. Patient understood the risks. All questions were answered. After discussion of options, patient requested Viscosupplementation. Oral informed consent was obtained and sterile prep of the injection site was performed using alcohol. Sterile technique was utilized for the procedure including the preparation of the solutions used for the injection. Ethyl chloride spray was used topically.  Sterile technique used. Patient tolerated procedure well. Post Procedure Instructions: Patient was advised to call if redness, pain, or fever occur and apply ice for 15 min. out of every hour for the next 12-24 hours as tolerated. patient was advised to rest the joint(s) for 2 days.  Ultrasound Guidance was used for the following reasons: for Glenohumeral injection.  Ultrasound guided injection was performed of the shoulder, visualization of the needle and placement of injection was performed without complication.

## 2024-01-24 NOTE — HISTORY OF PRESENT ILLNESS
[7] : 7 [Dull/Aching] : dull/aching [3] : 3 [] : This patient has had an injection before: yes [4] : 4 [Orthovisc] : Orthovisc [de-identified] : 1/24/24: Here for bilateral shoulder orthovisc #4.  1/17/24: Here for bilateral shoulder orthovisc #3.  1/10/24: Here for bilateral shoulder orthovisc #2.  1/3/24: Here for follow up. He reports continued pain. He would like to start visco series.  12/6/23: Here for follow up. He states right is worse than left. There is pain at night and with lifting.  8/9/23: Here for follow up. He states orthovisc helped about 20%. He states sleeping is a little better.  6/28/23: Here for Orthovisc #4 bilateral shoulders  6/7/23: Here for Orthovisc #3 bilateral shoulders  5/31/23: Here for Orthovisc #2 bilateral shoulders  5/24/23: Here for follow up.  He wants to try orthovisc injections.  4/28/23: 69 yo RHD male with bilateral shoulder pain since about 2021. Right is worse than left. There is pain with reaching. He has pain at night and is unable to sleep. He saw Dr. Chase and had CSI 4/20/23 with some relief. He sees pain mgt and used to take Naproxen and now Aleve. [FreeTextEntry1] : shoulders

## 2024-08-02 ENCOUNTER — APPOINTMENT (OUTPATIENT)
Dept: ORTHOPEDIC SURGERY | Facility: CLINIC | Age: 69
End: 2024-08-02
Payer: MEDICARE

## 2024-08-02 VITALS — WEIGHT: 165 LBS | BODY MASS INDEX: 25.01 KG/M2 | HEIGHT: 68 IN

## 2024-08-02 DIAGNOSIS — M19.012 PRIMARY OSTEOARTHRITIS, LEFT SHOULDER: ICD-10-CM

## 2024-08-02 DIAGNOSIS — M19.011 PRIMARY OSTEOARTHRITIS, RIGHT SHOULDER: ICD-10-CM

## 2024-08-02 PROCEDURE — 99214 OFFICE O/P EST MOD 30 MIN: CPT | Mod: 25

## 2024-08-02 PROCEDURE — 20611 DRAIN/INJ JOINT/BURSA W/US: CPT | Mod: LT

## 2024-08-02 PROCEDURE — J3490M: CUSTOM | Mod: NC,JZ

## 2024-08-02 RX ORDER — IBUPROFEN 600 MG/1
600 TABLET, FILM COATED ORAL 3 TIMES DAILY
Qty: 90 | Refills: 2 | Status: ACTIVE | COMMUNITY
Start: 2024-08-02 | End: 2024-10-31

## 2024-08-02 NOTE — HISTORY OF PRESENT ILLNESS
[de-identified] : 8/2/24: Here for follow up. He reports mild relief with last orthovisc series. He continues to have pain both shoulders.  1/24/24: Here for bilateral shoulder orthovisc #4.  1/17/24: Here for bilateral shoulder orthovisc #3.  1/10/24: Here for bilateral shoulder orthovisc #2.  1/3/24: Here for follow up. He reports continued pain. He would like to start visco series.  12/6/23: Here for follow up. He states right is worse than left. There is pain at night and with lifting.  8/9/23: Here for follow up. He states orthovisc helped about 20%. He states sleeping is a little better.  6/28/23: Here for Orthovisc #4 bilateral shoulders  6/7/23: Here for Orthovisc #3 bilateral shoulders  5/31/23: Here for Orthovisc #2 bilateral shoulders  5/24/23: Here for follow up.  He wants to try orthovisc injections.  4/28/23: 67 yo RHD male with bilateral shoulder pain since about 2021. Right is worse than left. There is pain with reaching. He has pain at night and is unable to sleep. He saw Dr. Chase and had CSI 4/20/23 with some relief. He sees pain mgt and used to take Naproxen and now Aleve.

## 2025-04-03 NOTE — HISTORY OF PRESENT ILLNESS
Subjective:   Anish Simental is a 69 year old male who presents for a MA AHA (Medicare Advantage Annual Health Assessment) and scheduled follow up of multiple significant but stable problems.   History of Present Illness  Anish Simental is a 69 year old male who presents for a complete physical exam to establish care.    He has hypertension managed with lisinopril and hydrochlorothiazide. His blood pressure is borderline controlled. No chest pain, shortness of breath, or palpitations.    He has hyperlipidemia and is on a cholesterol-lowering medication. He states his cholesterol levels have been 'better than fine' during previous visits.    He experiences gastroesophageal reflux disease and takes lansoprazole daily, with no episodes for a few years. He has not undergone an endoscopy.    He has benign prostatic hyperplasia with nocturia, urinating two to three times per night. He sees a urologist annually. Approximately four years ago, he underwent a procedure for meatal stenosis, involving urethral dilation. He uses catheters two to three times a week to maintain patency.    He has erectile dysfunction and takes Cialis for management.    He has a history of multiple basal cell carcinomas, primarily above the neck, and is under dermatological care. He has undergone at least five Mohs surgeries and currently has two lesions on the top of his head, awaiting biopsy results.    He quit smoking approximately five years ago after being advised that nicotine use could impede the healing of a shoulder injury. He was vaping at the time but has since ceased all nicotine use.    He is physically active, walking three to four times a week and playing pickleball three to four times a week. He previously lost weight using Ozempic, reducing from 274 pounds to 198 pounds, but has since regained some weight.  History of Present Illness  Anish Simental is a 69 year old male who presents for a complete  [Sudden] : sudden physical exam to establish care.    He has hypertension managed with lisinopril and hydrochlorothiazide. His blood pressure is borderline controlled. No chest pain, shortness of breath, or palpitations.  He was on a higher dose when he weighed significantly more.  Had lost a significant amount of weight and blood pressure medications were reduced as a consequence.  He has now gained approximately 30 pounds again and blood pressure is borderline.    He has hyperlipidemia and is on a cholesterol-lowering medication. He states his cholesterol levels have been 'better than fine' during previous visits.    He experiences gastroesophageal reflux disease and takes lansoprazole daily, with no episodes for a few years. He has not undergone an endoscopy.    He has benign prostatic hyperplasia with nocturia, urinating two to three times per night. He sees a urologist annually. Approximately four years ago, he underwent a procedure for meatal stenosis, involving urethral dilation. He uses catheters two to three times a week to maintain patency.    He has erectile dysfunction and takes Cialis for management.    He has a history of multiple basal cell carcinomas, primarily above the neck, and is under dermatological care. He has undergone at least five Mohs surgeries and currently has two lesions on the top of his head, awaiting biopsy results.    He quit smoking approximately five years ago after being advised that nicotine use could impede the healing of a shoulder injury. He was vaping at the time but has since ceased all nicotine use.    His daughter committed suicide 3 weeks ago after overdosing on Tylenol.  He will be seeing a psychiatrist at [facility] in 2 weeks.    Colonoscopy but will do fit test.  Urinates 2-3 times nightly.    He is physically active, walking three to four times a week and playing pickleball three to four times a week. He previously lost weight using Ozempic, reducing from 274 pounds to 198 pounds,  [5] : 5 but has since regained some weight.    History/Other:   Fall Risk Assessment:   He has been screened for Falls and is High Risk. Fall Prevention information provided to patient in After Visit Summary.    Do you feel unsteady when standing or walking?: No  Do you worry about falling?: No  Have you fallen in the past year?: Yes  How many times have you fallen?: 1  Were you injured?: No     Cognitive Assessment:   He had a completely normal cognitive assessment - see flowsheet entries     Functional Ability/Status:   Anish Simental has some abnormal functions as listed below:  He has difficulties Affording Meds based on screening of functional status. He has Hearing problems based on screening of functional status.He has Vision problems based on screening of functional status.       Depression Screening (PHQ):  PHQ-9 TOTAL SCORE: 4  , done 4/3/2025   Little interest or pleasure in doing things: 2    Feeling down, depressed, or hopeless: 2    If you checked off any problems, how difficult have these problems made it for you to do your work, take care of things at home, or get along with other people?: Not difficult at all    Advanced Directives:   He does NOT have a Living Will. [Do you have a living will?: No]  He does NOT have a Power of  for Health Care. [Do you have a healthcare power of ?: No]  Discussed Advance Care Planning with patient (and family/surrogate if present). Standard forms made available to patient in After Visit Summary.      Patient Active Problem List   Diagnosis    Primary hypertension    Hypercholesterolemia    Erectile dysfunction    GERD (gastroesophageal reflux disease)    BCC (basal cell carcinoma of skin)    BPH associated with nocturia    Congenital stricture of urethra    Class 1 obesity due to excess calories without serious comorbidity with body mass index (BMI) of 33.0 to 33.9 in adult     Allergies:  He is allergic to penicillins.    Current  Medications:  Outpatient Medications Marked as Taking for the 4/3/25 encounter (Office Visit) with Carl Jordan MD   Medication Sig    atorvastatin 10 MG Oral Tab Take 1 tablet (10 mg total) by mouth nightly.    lisinopril-hydroCHLOROthiazide 10-12.5 MG Oral Tab Take 1 tablet by mouth daily.    tadalafil 5 MG Oral Tab Take 1 tablet (5 mg total) by mouth daily as needed for Erectile Dysfunction.    Lansoprazole 15 MG Oral Capsule Delayed Release Take 1 capsule (15 mg total) by mouth daily.       Medical History:  He  has a past medical history of Basal cell carcinoma (2017), Basal cell carcinoma (2017), BCC (basal cell carcinoma of skin) (2017), BCC (basal cell carcinoma of skin) (2017), BCC (basal cell carcinoma) (2022), BCC (basal cell carcinoma) (2022), BCC (basal cell carcinoma) (2023), BPH (benign prostatic hyperplasia), Cancer (HCC) (15 years), Erectile dysfunction, Essential hypertension, Folliculitis (2020), GERD (gastroesophageal reflux disease), Hearing impairment, Hypercholesterolemia, Obesity (2010), Recurrent skin cancer, and Scoliosis (1978).  Surgical History:  He  has a past surgical history that includes elbow surgery (Right, 1971); other (09/2008); other (11/2008); other (07/2016); other (08/2017); other (12/2017); other (07/2019); Inguinal hernia repair (Right, 03/20/2024); other surgical history (Left, 05/06/2024); hernia surgery (2023); and skin surgery.   Family History:  His family history includes ALS in his father; Alzheimers in his mother; Cancer in his maternal grandfather, maternal grandfather, and sister; Dementia in his mother; Depression in his brother and mother; Diabetes in his maternal grandfather; Glioblastoma in his sister; Heart Disease in his father and paternal uncle; Heart Disorder in his father; Obesity in his sister; Psychiatric in his daughter, sister, and sister.  Social History:  He  reports that he quit smoking about 3 years ago. His smoking use included cigarettes. He  [0] : 0 [Dull/Aching] : dull/aching started smoking about 38 years ago. He has a 35 pack-year smoking history. He has never been exposed to tobacco smoke. He has never used smokeless tobacco. He reports current alcohol use of about 16.0 standard drinks of alcohol per week. He reports that he does not use drugs.    Tobacco:  Social History     Tobacco Use   Smoking Status Former    Current packs/day: 0.00    Average packs/day: 1 pack/day for 35.0 years (35.0 ttl pk-yrs)    Types: Cigarettes    Start date: 1/12/1987    Quit date: 1/12/2022    Years since quitting: 3.2    Passive exposure: Never   Smokeless Tobacco Never   Tobacco Comments    None since 2022     E-Cigarettes/Vaping       Questions Responses    E-Cigarette Use Current Every Day User    Passive Exposure No          E-Cigarette/Vaping Substances       Questions Responses    Nicotine No    THC No    CBD No    Flavoring No          E-Cigarette/Vaping Devices       Questions Responses    Disposable No    Pre-filled or Refillable Cartridge No    Refillable Tank No    Pre-filled Pod No           He smoked tobacco in the past but quit greater than 12 months ago.  Social History     Tobacco Use   Smoking Status Former    Current packs/day: 0.00    Average packs/day: 1 pack/day for 35.0 years (35.0 ttl pk-yrs)    Types: Cigarettes    Start date: 1/12/1987    Quit date: 1/12/2022    Years since quitting: 3.2    Passive exposure: Never   Smokeless Tobacco Never   Tobacco Comments    None since 2022          CAGE Alcohol Screen:   CAGE screening score of 0 on 4/3/2025, showing low risk of alcohol abuse.      Patient Care Team:  Carl Jordan MD as PCP - General (Internal Medicine)  Mariano Saeed MD (SURGERY, GENERAL)  Pam Tesfaye MD (UROLOGY)  Kathy Oconnell MD (DERMATOLOGY)    Review of Systems   Constitutional: Negative.    HENT: Negative.     Eyes: Negative.    Respiratory: Negative.     Cardiovascular: Negative.    Gastrointestinal: Negative.    Endocrine: Negative.    Genitourinary: Negative.     Musculoskeletal: Negative.    Skin: Negative.    Allergic/Immunologic: Negative.    Neurological: Negative.    Hematological: Negative.    Psychiatric/Behavioral:  Positive for dysphoric mood.      Objective:   Physical Exam  Constitutional:       Appearance: Normal appearance. He is well-developed.   HENT:      Head: Normocephalic.      Right Ear: Tympanic membrane, ear canal and external ear normal. There is no impacted cerumen.      Left Ear: Tympanic membrane, ear canal and external ear normal. There is no impacted cerumen.   Eyes:      General: No scleral icterus.     Pupils: Pupils are equal, round, and reactive to light.   Neck:      Vascular: No JVD.   Cardiovascular:      Rate and Rhythm: Normal rate and regular rhythm.      Pulses: Normal pulses.      Heart sounds: Normal heart sounds. No murmur heard.  Pulmonary:      Effort: Pulmonary effort is normal. No respiratory distress.      Breath sounds: Normal breath sounds. No stridor. No wheezing, rhonchi or rales.   Chest:      Chest wall: No tenderness.   Abdominal:      General: Abdomen is flat. Bowel sounds are normal. There is no distension.      Palpations: Abdomen is soft.      Tenderness: There is no abdominal tenderness. There is no guarding or rebound.   Musculoskeletal:         General: Normal range of motion.      Cervical back: Normal range of motion.   Lymphadenopathy:      Cervical: No cervical adenopathy.   Skin:     General: Skin is warm.   Neurological:      General: No focal deficit present.      Mental Status: He is alert.      Cranial Nerves: No cranial nerve deficit.   Psychiatric:         Mood and Affect: Mood is depressed.         Speech: Speech is delayed.       /80 (BP Location: Right arm, Patient Position: Sitting, Cuff Size: adult)   Pulse 88   Temp 97.1 °F (36.2 °C) (Temporal)   Resp 16   Ht 5' 10\" (1.778 m)   Wt 232 lb 3.2 oz (105.3 kg)   SpO2 98%   BMI 33.32 kg/m²  Estimated body mass index is 33.32 kg/m² as  [Localized] : localized [Intermittent] : intermittent calculated from the following:    Height as of this encounter: 5' 10\" (1.778 m).    Weight as of this encounter: 232 lb 3.2 oz (105.3 kg).    Medicare Hearing Assessment:   Hearing Screening    Screening Method: Finger Rub  Finger Rub Result: Fail (Comment: wears hearing aids)               Assessment & Plan:   Anish Simental is a 69 year old male who presents for a Medicare Assessment.     1. Encounter for annual health examination (Primary)  -     CBC With Differential With Platelet; Future; Expected date: 04/03/2025  -     Comp Metabolic Panel (14); Future; Expected date: 04/03/2025  -     Lipid Panel; Future; Expected date: 04/03/2025  -     TSH W Reflex To Free T4; Future; Expected date: 04/03/2025  -     PSA Total, Diagnostic; Future; Expected date: 04/03/2025    2. Primary hypertension  -     Expanded, Low Complexity (12172)  -     Microalb/Creat Ratio, Random Urine; Future; Expected date: 04/03/2025    3. Hypercholesterolemia  -     Expanded, Low Complexity (53396)  -     Comp Metabolic Panel (14); Future; Expected date: 04/03/2025  -     Lipid Panel; Future; Expected date: 04/03/2025    4. Gastroesophageal reflux disease, unspecified whether esophagitis present  -     Expanded, Low Complexity (80128)  - Continue lansoprazole.    5. BPH associated with nocturia  -     Expanded, Low Complexity (01124)  -     PSA Total, Diagnostic; Future; Expected date: 04/03/2025    6. Congenital stricture of urethra  Overview:  S/p dilation by urology.  Orders:  -     Expanded, Low Complexity (75896)  - Follow-up with urology.  - Periodic catheterization at home.    7. Erectile dysfunction, unspecified erectile dysfunction type  -     Expanded, Low Complexity (40674)  -     PSA Total, Diagnostic; Future; Expected date: 04/03/2025  -     Hemoglobin A1C; Future; Expected date: 04/03/2025    8. Class 1 obesity due to excess calories without serious comorbidity with body mass index (BMI) of 33.0 to 33.9 in adult  -      Referral to Bariatrics  -     Expanded, Low Complexity (00242)    9. Basal cell carcinoma (BCC) of skin of face, unspecified part of face  Overview:  left vertex scalp    Orders:  -     Expanded, Low Complexity (43869)  - Follow-up with dermatology.    10. Bereavement  -     Expanded, Low Complexity (50173)  - Consultation with psychiatry in 2 weeks.    11. Colon cancer screening  -     Occult Blood, Fecal, FIT Immunoassay; Future; Expected date: 04/03/2025  - Refusing colonoscopy.    Assessment & Plan  Hypertension  Borderline controlled with lisinopril and hydrochlorothiazide.  - Continue lisinopril and hydrochlorothiazide.    Hyperlipidemia  Well-controlled with current medication.  - Continue current cholesterol-lowering medication.    Gastroesophageal Reflux Disease (GERD)  Well-managed with lansoprazole.  - Continue lansoprazole daily.    Benign Prostatic Hyperplasia (BPH) with Meatal Stenosis  BPH with nocturia. Meatal stenosis treated surgically. Uses catheters to maintain patency.  - Continue catheter use as needed.  - Follow up with urologist as needed.    Erectile Dysfunction  Managed with Cialis.  - Continue Cialis as needed.    Basal Cell Carcinoma  Multiple lesions treated with Mohs surgery. Awaiting biopsy results for head lesions.  - Await biopsy results for lesions on the head.  - Follow up with dermatologist for potential Mohs surgery.    Weight Management  Regained weight after loss with Ozempic. Discussed referral to specialist due to insurance challenges.  - Consider referral to weight loss specialist for management options.    The patient indicates understanding of these issues and agrees to the plan.  Reinforced healthy diet, lifestyle, and exercise.      Return in about 6 months (around 10/3/2025) for Routine Follow-Up.     aCrl Jordan MD, 4/3/2025     Supplementary Documentation:   General Health:  In the past six months, have you lost more than 10 pounds without trying?: 2 - No  Has your  [Leisure] : leisure [Sleep] : sleep appetite been poor?: No  Type of Diet: Balanced  How does the patient maintain a good energy level?: Appropriate Exercise, Other  How would you describe your daily physical activity?: Moderate  How would you describe your current health state?: Fair  How do you maintain positive mental well-being?: Social Interaction, Puzzles, Games, Visiting Friends, Visiting Family  On a scale of 0 to 10, with 0 being no pain and 10 being severe pain, what is your pain level?: 0 - (None)  In the past six months, have you experienced urine leakage?: 0-No  At any time do you feel concerned for the safety/well-being of yourself and/or your children, in your home or elsewhere?: No  Have you had any immunizations at another office such as Influenza, Hepatitis B, Tetanus, or Pneumococcal?: Yes    Health Maintenance   Topic Date Due    Lung Cancer Screening  Never done    Annual Well Visit  01/01/2025    Tobacco Cessation Counseling  Never done    COVID-19 Vaccine (8 - 2024-25 season) 04/04/2025    Colorectal Cancer Screening  04/09/2025    PSA  04/04/2026    Influenza Vaccine  Completed    Annual Depression Screening  Completed    Fall Risk Screening (Annual)  Completed    Pneumococcal Vaccine: 50+ Years  Completed    Zoster Vaccines  Completed    Meningococcal B Vaccine  Aged Out      [Rest] : rest [Injection therapy] : injection therapy [de-identified] : had orthovisc completed Nov 2021 and di well until recently, no injury, uses OTCs,  [] : Post Surgical Visit: no [FreeTextEntry1] : right shoulder [de-identified] : activity/sleep [de-identified] :